# Patient Record
Sex: MALE | NOT HISPANIC OR LATINO | Employment: OTHER | URBAN - METROPOLITAN AREA
[De-identification: names, ages, dates, MRNs, and addresses within clinical notes are randomized per-mention and may not be internally consistent; named-entity substitution may affect disease eponyms.]

---

## 2019-01-01 ENCOUNTER — TRANSFERRED RECORDS (OUTPATIENT)
Dept: HEALTH INFORMATION MANAGEMENT | Facility: CLINIC | Age: 54
End: 2019-01-01

## 2019-07-17 ENCOUNTER — OFFICE VISIT (OUTPATIENT)
Dept: PEDIATRICS | Facility: CLINIC | Age: 54
End: 2019-07-17
Payer: COMMERCIAL

## 2019-07-17 VITALS
WEIGHT: 207.6 LBS | BODY MASS INDEX: 29.06 KG/M2 | OXYGEN SATURATION: 97 % | TEMPERATURE: 98.6 F | DIASTOLIC BLOOD PRESSURE: 76 MMHG | SYSTOLIC BLOOD PRESSURE: 122 MMHG | HEIGHT: 71 IN | HEART RATE: 80 BPM

## 2019-07-17 DIAGNOSIS — Z91.030 ALLERGY TO BEE STING: Primary | ICD-10-CM

## 2019-07-17 PROCEDURE — 99213 OFFICE O/P EST LOW 20 MIN: CPT | Mod: GE | Performed by: STUDENT IN AN ORGANIZED HEALTH CARE EDUCATION/TRAINING PROGRAM

## 2019-07-17 RX ORDER — EPINEPHRINE 0.3 MG/.3ML
0.3 INJECTION SUBCUTANEOUS
Qty: 1 EACH | Status: SHIPPED | OUTPATIENT
Start: 2019-07-17 | End: 2023-03-09

## 2019-07-17 ASSESSMENT — MIFFLIN-ST. JEOR: SCORE: 1798.8

## 2019-07-17 NOTE — PROGRESS NOTES
"Subjective     Francisco Castle is a 53 year old male who presents to clinic today for the following health issues:    HPI   Epi pen refill last reaction back in  (patient stung in posterior neck and experienced angioedema, vision changes and hives). Has not needed to use an epi-pen since then, just . No other allergies as far as patient knows. Going camping in [place] soon so would not like to have the same experience.     Retired from the [employer] this year, last physical exam in 2018; would like full physical in a few months. Colonoscopy earlier this spring 2019, was reportedly negative at [facility].     There is no problem list on file for this patient.  Confirmed with patient  History reviewed. No pertinent surgical history.   Confirmed with patient   Social History     Tobacco Use     Smoking status: Smoker, Current Status Unknown     Smokeless tobacco: Never Used     Tobacco comment: states smokes 1 pack per year   Substance Use Topics     Alcohol use: Yes     Comment: social     History reviewed. No pertinent family history.   Did not elicit      Current Outpatient Medications   Medication Sig Dispense Refill     EPINEPHrine (EPIPEN/ADRENACLICK/OR ANY BX GENERIC EQUIV) 0.3 MG/0.3ML injection 2-pack Inject 0.3 mLs (0.3 mg) into the muscle once as needed for anaphylaxis 1 each prn     EPINEPHrine (EPIPEN 2-DAVI) 0.3 MG/0.3ML injection Inject SQ PRN. 2 each 3     NO ACTIVE MEDICATIONS        Allergies   Allergen Reactions     Bees      Reviewed and updated as needed this visit by Provider  Tobacco  Soc Hx      Review of Systems   ROS COMP: Constitutional, HEENT, cardiovascular, pulmonary, gi and gu systems are negative, except as otherwise noted.      Objective    /76 (BP Location: Right arm, Patient Position: Chair, Cuff Size: Adult Large)   Pulse 80   Temp 98.6  F (37  C) (Oral)   Ht 1.803 m (5' 11\")   Wt 94.2 kg (207 lb 9.6 oz)   SpO2 97%   BMI 28.95 kg/m    Body mass index is " 28.95 kg/m .   Physical Exam   GENERAL: healthy, alert and no distress  EYES: Eyes grossly normal to inspection, PERRL and conjunctivae and sclerae normal  HENT: nose and mouth without ulcers or lesions  NECK: no adenopathy, no asymmetry, masses, or scars   RESP: lungs clear to auscultation - no rales, rhonchi or wheezes  CV: regular rate and rhythm, normal S1 S2, no S3 or S4, no murmur, click or rub, no peripheral edema and peripheral pulses strong  ABDOMEN: soft, nontender, no hepatosplenomegaly, no masses and bowel sounds normal  MS: no gross musculoskeletal defects noted, no edema  SKIN: no suspicious lesions or rashes  NEURO: Normal strength and tone, mentation intact and speech normal  PSYCH: mentation appears normal, affect normal/bright    Diagnostic Test Results:  Labs reviewed in Epic    Assessment & Plan   Francisco Castle is a 52 yo gentleman, with no significant medical history coming in today for epi-pen refill    1. Allergy to bee sting  Has not had any allergic reactions since ; has not needed epi-pen since then. However patient is going on trip and medication has  so is coming in today for this. Did discuss annual physical exam that will be due in 2019; had previous exams through the [employer]. (retired recently).   - EPINEPHrine (EPIPEN/ADRENACLICK/OR ANY BX GENERIC EQUIV) 0.3 MG/0.3ML injection 2-pack; Inject 0.3 mLs (0.3 mg) into the muscle once as needed for anaphylaxis  Dispense: 1 each; Refill: prn    Follow up in 2019 for yearly physical     This patient [facility] Spicer [facility]  [facility]    I have discussed the patient with Dr. Spicer and agree with the jointly developed plan as documented above.    Rosa Orosco MD  Internal Medicine-Pediatrics

## 2021-08-24 ENCOUNTER — NURSE TRIAGE (OUTPATIENT)
Dept: PEDIATRICS | Facility: CLINIC | Age: 56
End: 2021-08-24

## 2021-08-24 NOTE — TELEPHONE ENCOUNTER
"o2 95 with a pulse of 92 at rest. Patient feels SOB at rest and has noticed a racing heart at times.     Reason for Disposition    MODERATE difficulty breathing (e.g., speaks in phrases, SOB even at rest, pulse 100-120)    Answer Assessment - Initial Assessment Questions  1. COVID-19 DIAGNOSIS: \"Who made your Coronavirus (COVID-19) diagnosis?\" \"Was it confirmed by a positive lab test?\" If not diagnosed by a HCP, ask \"Are there lots of cases (community spread) where you live?\" (See [facility] health department website, if unsure)      Dx positive on 21 at [facility]   2. COVID-19 EXPOSURE: \"Was there any known exposure to COVID before the symptoms began?\" CDC Definition of close contact: within 6 feet (2 meters) for a total of 15 minutes or more over a 24-hour period.       Patient went to a  and was exposed to COVID there.  3. ONSET: \"When did the COVID-19 symptoms start?\"      Symptoms started on 21.   4. WORST SYMPTOM: \"What is your worst symptom?\" (e.g., cough, fever, shortness of breath, muscle aches)      Fatigue and lack of appitit.   5. COUGH: \"Do you have a cough?\" If so, ask: \"How bad is the cough?\"        Yes but intermittent. Not waking up at night due to a cough.   6. FEVER: \"Do you have a fever?\" If so, ask: \"What is your temperature, how was it measured, and when did it start?\"      Afebrile  7. RESPIRATORY STATUS: \"Describe your breathing?\" (e.g., shortness of breath, wheezing, unable to speak)       Short of breath, wheezing in the AM, O2 at rest right now is 95%  8. BETTER-SAME-WORSE: \"Are you getting better, staying the same or getting worse compared to yesterday?\"  If getting worse, ask, \"In what way?\"      The same.   9. HIGH RISK DISEASE: \"Do you have any chronic medical problems?\" (e.g., asthma, heart or lung disease, weak immune system, obesity, etc.)      None   10. PREGNANCY: \"Is there any chance you are pregnant?\" \"When was your last menstrual period?\"        n/a  11. " "OTHER SYMPTOMS: \"Do you have any other symptoms?\"  (e.g., chills, fatigue, headache, loss of smell or taste, muscle pain, sore throat; new loss of smell or taste especially support the diagnosis of COVID-19)        Lack of appetite and fatigue, and increased HR (currently 92 at rest.)    Protocols used: CORONAVIRUS (COVID-19) DIAGNOSED OR [id number] 3.25    Roseann Michaels RN on 8/24/2021 at 1:26 PM    "

## 2023-03-09 ENCOUNTER — OFFICE VISIT (OUTPATIENT)
Dept: PEDIATRICS | Facility: CLINIC | Age: 58
End: 2023-03-09
Payer: COMMERCIAL

## 2023-03-09 VITALS
TEMPERATURE: 98.1 F | WEIGHT: 213.2 LBS | HEIGHT: 71 IN | OXYGEN SATURATION: 98 % | BODY MASS INDEX: 29.85 KG/M2 | SYSTOLIC BLOOD PRESSURE: 170 MMHG | DIASTOLIC BLOOD PRESSURE: 88 MMHG | HEART RATE: 101 BPM | RESPIRATION RATE: 20 BRPM

## 2023-03-09 DIAGNOSIS — Z91.030 ALLERGY TO BEE STING: ICD-10-CM

## 2023-03-09 DIAGNOSIS — Z00.00 ROUTINE GENERAL MEDICAL EXAMINATION AT A HEALTH CARE FACILITY: Primary | ICD-10-CM

## 2023-03-09 DIAGNOSIS — Z13.1 SCREENING FOR DIABETES MELLITUS: ICD-10-CM

## 2023-03-09 DIAGNOSIS — R22.1 NECK MASS: ICD-10-CM

## 2023-03-09 DIAGNOSIS — R03.0 ELEVATED BLOOD PRESSURE READING WITHOUT DIAGNOSIS OF HYPERTENSION: ICD-10-CM

## 2023-03-09 DIAGNOSIS — Z13.220 SCREENING FOR HYPERLIPIDEMIA: ICD-10-CM

## 2023-03-09 LAB
ALBUMIN UR-MCNC: NEGATIVE MG/DL
ANION GAP SERPL CALCULATED.3IONS-SCNC: 10 MMOL/L (ref 7–15)
APPEARANCE UR: CLEAR
BASOPHILS # BLD AUTO: 0 10E3/UL (ref 0–0.2)
BASOPHILS NFR BLD AUTO: 0 %
BILIRUB UR QL STRIP: NEGATIVE
BUN SERPL-MCNC: 15.6 MG/DL (ref 8–23)
CALCIUM SERPL-MCNC: 9.7 MG/DL (ref 8.6–10)
CHLORIDE SERPL-SCNC: 106 MMOL/L (ref 98–107)
CHOLEST SERPL-MCNC: 193 MG/DL
COLOR UR AUTO: YELLOW
CREAT SERPL-MCNC: 0.98 MG/DL (ref 0.67–1.17)
DEPRECATED HCO3 PLAS-SCNC: 27 MMOL/L (ref 22–29)
EOSINOPHIL # BLD AUTO: 0.1 10E3/UL (ref 0–0.7)
EOSINOPHIL NFR BLD AUTO: 1 %
ERYTHROCYTE [DISTWIDTH] IN BLOOD BY AUTOMATED COUNT: 11.9 % (ref 10–15)
GFR SERPL CREATININE-BSD FRML MDRD: 89 ML/MIN/1.73M2
GLUCOSE SERPL-MCNC: 122 MG/DL (ref 70–99)
GLUCOSE UR STRIP-MCNC: NEGATIVE MG/DL
HBA1C MFR BLD: 5.5 % (ref 0–5.6)
HCT VFR BLD AUTO: 43.9 % (ref 40–53)
HDLC SERPL-MCNC: 36 MG/DL
HGB BLD-MCNC: 14.8 G/DL (ref 13.3–17.7)
HGB UR QL STRIP: NEGATIVE
KETONES UR STRIP-MCNC: NEGATIVE MG/DL
LDLC SERPL CALC-MCNC: 123 MG/DL
LEUKOCYTE ESTERASE UR QL STRIP: NEGATIVE
LYMPHOCYTES # BLD AUTO: 2.3 10E3/UL (ref 0.8–5.3)
LYMPHOCYTES NFR BLD AUTO: 32 %
MCH RBC QN AUTO: 31 PG (ref 26.5–33)
MCHC RBC AUTO-ENTMCNC: 33.7 G/DL (ref 31.5–36.5)
MCV RBC AUTO: 92 FL (ref 78–100)
MONOCYTES # BLD AUTO: 0.5 10E3/UL (ref 0–1.3)
MONOCYTES NFR BLD AUTO: 8 %
NEUTROPHILS # BLD AUTO: 4 10E3/UL (ref 1.6–8.3)
NEUTROPHILS NFR BLD AUTO: 58 %
NITRATE UR QL: NEGATIVE
NONHDLC SERPL-MCNC: 157 MG/DL
PH UR STRIP: 5.5 [PH] (ref 5–7)
PLATELET # BLD AUTO: 226 10E3/UL (ref 150–450)
POTASSIUM SERPL-SCNC: 4.6 MMOL/L (ref 3.4–5.3)
RBC # BLD AUTO: 4.78 10E6/UL (ref 4.4–5.9)
RBC #/AREA URNS AUTO: NORMAL /HPF
SODIUM SERPL-SCNC: 143 MMOL/L (ref 136–145)
SP GR UR STRIP: 1.02 (ref 1–1.03)
TRIGL SERPL-MCNC: 168 MG/DL
UROBILINOGEN UR STRIP-ACNC: 0.2 E.U./DL
WBC # BLD AUTO: 6.9 10E3/UL (ref 4–11)
WBC #/AREA URNS AUTO: NORMAL /HPF

## 2023-03-09 PROCEDURE — 83036 HEMOGLOBIN GLYCOSYLATED A1C: CPT

## 2023-03-09 PROCEDURE — 90750 HZV VACC RECOMBINANT IM: CPT

## 2023-03-09 PROCEDURE — 85025 COMPLETE CBC W/AUTO DIFF WBC: CPT

## 2023-03-09 PROCEDURE — 90471 IMMUNIZATION ADMIN: CPT

## 2023-03-09 PROCEDURE — 99396 PREV VISIT EST AGE 40-64: CPT | Mod: 25

## 2023-03-09 PROCEDURE — 90472 IMMUNIZATION ADMIN EACH ADD: CPT

## 2023-03-09 PROCEDURE — 80061 LIPID PANEL: CPT

## 2023-03-09 PROCEDURE — 36415 COLL VENOUS BLD VENIPUNCTURE: CPT

## 2023-03-09 PROCEDURE — 80048 BASIC METABOLIC PNL TOTAL CA: CPT

## 2023-03-09 PROCEDURE — 81001 URINALYSIS AUTO W/SCOPE: CPT

## 2023-03-09 PROCEDURE — 90715 TDAP VACCINE 7 YRS/> IM: CPT

## 2023-03-09 RX ORDER — EPINEPHRINE 0.3 MG/.3ML
0.3 INJECTION SUBCUTANEOUS
Qty: 1 EACH | Status: SHIPPED | OUTPATIENT
Start: 2023-03-09

## 2023-03-09 SDOH — ECONOMIC STABILITY: FOOD INSECURITY: WITHIN THE PAST 12 MONTHS, YOU WORRIED THAT YOUR FOOD WOULD RUN OUT BEFORE YOU GOT MONEY TO BUY MORE.: PATIENT DECLINED

## 2023-03-09 SDOH — ECONOMIC STABILITY: INCOME INSECURITY: HOW HARD IS IT FOR YOU TO PAY FOR THE VERY BASICS LIKE FOOD, HOUSING, MEDICAL CARE, AND HEATING?: PATIENT DECLINED

## 2023-03-09 SDOH — ECONOMIC STABILITY: INCOME INSECURITY: IN THE LAST 12 MONTHS, WAS THERE A TIME WHEN YOU WERE NOT ABLE TO PAY THE MORTGAGE OR RENT ON TIME?: PATIENT REFUSED

## 2023-03-09 SDOH — ECONOMIC STABILITY: FOOD INSECURITY: WITHIN THE PAST 12 MONTHS, THE FOOD YOU BOUGHT JUST DIDN'T LAST AND YOU DIDN'T HAVE MONEY TO GET MORE.: PATIENT DECLINED

## 2023-03-09 SDOH — HEALTH STABILITY: PHYSICAL HEALTH
ON AVERAGE, HOW MANY DAYS PER WEEK DO YOU ENGAGE IN MODERATE TO STRENUOUS EXERCISE (LIKE A BRISK WALK)?: PATIENT DECLINED

## 2023-03-09 SDOH — ECONOMIC STABILITY: TRANSPORTATION INSECURITY
IN THE PAST 12 MONTHS, HAS THE LACK OF TRANSPORTATION KEPT YOU FROM MEDICAL APPOINTMENTS OR FROM GETTING MEDICATIONS?: PATIENT DECLINED

## 2023-03-09 SDOH — HEALTH STABILITY: PHYSICAL HEALTH: ON AVERAGE, HOW MANY MINUTES DO YOU ENGAGE IN EXERCISE AT THIS LEVEL?: PATIENT DECLINED

## 2023-03-09 SDOH — ECONOMIC STABILITY: TRANSPORTATION INSECURITY
IN THE PAST 12 MONTHS, HAS LACK OF TRANSPORTATION KEPT YOU FROM MEETINGS, WORK, OR FROM GETTING THINGS NEEDED FOR DAILY LIVING?: PATIENT DECLINED

## 2023-03-09 ASSESSMENT — ENCOUNTER SYMPTOMS
HEADACHES: 0
ARTHRALGIAS: 0
CONSTIPATION: 0
FREQUENCY: 0
ABDOMINAL PAIN: 0
SHORTNESS OF BREATH: 0
HEMATOCHEZIA: 0
CHILLS: 0
HEMATURIA: 0
HEARTBURN: 0
JOINT SWELLING: 0
PARESTHESIAS: 0
DIARRHEA: 0
NERVOUS/ANXIOUS: 0
FEVER: 0
DYSURIA: 0
DIZZINESS: 0
NAUSEA: 0
WEAKNESS: 0
EYE PAIN: 0
COUGH: 0
MYALGIAS: 0
SORE THROAT: 0
PALPITATIONS: 0

## 2023-03-09 ASSESSMENT — SOCIAL DETERMINANTS OF HEALTH (SDOH)
IN A TYPICAL WEEK, HOW MANY TIMES DO YOU TALK ON THE PHONE WITH FAMILY, FRIENDS, OR NEIGHBORS?: PATIENT DECLINED
ARE YOU MARRIED, WIDOWED, DIVORCED, SEPARATED, NEVER MARRIED, OR LIVING WITH A PARTNER?: PATIENT DECLINED
DO YOU BELONG TO ANY CLUBS OR ORGANIZATIONS SUCH AS CHURCH GROUPS UNIONS, FRATERNAL OR ATHLETIC GROUPS, OR SCHOOL GROUPS?: PATIENT DECLINED
HOW OFTEN DO YOU GET TOGETHER WITH FRIENDS OR RELATIVES?: PATIENT DECLINED
HOW OFTEN DO YOU ATTEND CHURCH OR RELIGIOUS SERVICES?: PATIENT DECLINED

## 2023-03-09 ASSESSMENT — LIFESTYLE VARIABLES
HOW MANY STANDARD DRINKS CONTAINING ALCOHOL DO YOU HAVE ON A TYPICAL DAY: PATIENT DECLINED
SKIP TO QUESTIONS 9-10: 0
HOW OFTEN DO YOU HAVE SIX OR MORE DRINKS ON ONE OCCASION: PATIENT DECLINED
AUDIT-C TOTAL SCORE: -1
HOW OFTEN DO YOU HAVE A DRINK CONTAINING ALCOHOL: PATIENT DECLINED

## 2023-03-09 ASSESSMENT — PAIN SCALES - GENERAL: PAINLEVEL: NO PAIN (0)

## 2023-03-09 NOTE — PROGRESS NOTES
SUBJECTIVE:   CC: Francisco is an 57 year old who presents for preventative health visit.   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 Checks BP at drug store when getting vaccines and it has been high in the 160s systolic recently. Eats a lot of salt so thinks that is contributing to/the cause of his high BP. Would like to avoid medications and try cutting out salt first. Exercises five days a week, uses treadmill and weight lifting machines. Has a lot of stress with job.    Has a large lump under his neck that has been present for years, has never been evaluated. His father had the same thing so he never sought care.    Hearing issues, worse in busy rooms. Had exposure to sirens working as a .    Does not want COVID-19 vaccines, has had COVID infection multiple times and did okay with that so would like to avoid the vaccine.      Healthy Habits:     Getting at least 3 servings of Calcium per day:  Yes    Bi-annual eye exam:  NO    Dental care twice a year:  NO    Sleep apnea or symptoms of sleep apnea:  None    Diet:  Other    Frequency of exercise:  4-5 days/week    Duration of exercise:  30-45 minutes    Taking medications regularly:  Not Applicable    Medication side effects:  Not applicable    PHQ-2 Total Score: 0    Additional concerns today:  No    Today's PHQ-2 Score:   PHQ-2 ( 1999 Pfizer) 3/9/2023   Q1: Little interest or pleasure in doing things 0   Q2: Feeling down, depressed or hopeless 0   PHQ-2 Score 0   PHQ-2 Total Score (12-17 Years)- Positive if 3 or more points; Administer PHQ-A if positive -   Q1: Little interest or pleasure in doing things Not at all   Q2: Feeling down, depressed or hopeless Not at all   PHQ-2 Score 0       Have you ever done Advance Care Planning? (For example, a Health Directive,  POLST, or a discussion with a medical provider or your loved ones about your wishes): No, advance care planning information given to patient to review.  Patient declined advance care planning discussion at this time.    Social History     Tobacco Use     Smoking status: Former     Packs/day: 1.00     Years: 5.00     Pack years: 5.00     Types: Cigarettes     Smokeless tobacco: Never     Tobacco comments:     states smokes 1 pack per year   Substance Use Topics     Alcohol use: Yes     Comment: social, 6 in a week       Alcohol Use 3/9/2023   Prescreen: >3 drinks/day or >7 drinks/week? No       Last PSA: No results found for: PSA    Reviewed orders with patient. Reviewed health maintenance and updated orders accordingly - Yes    Reviewed and updated as needed this visit by clinical staff   Tobacco  Allergies  Meds   Med Hx  Surg Hx  Fam Hx  Soc Hx        Reviewed and updated as needed this visit by Provider   Tobacco     Med Hx  Surg Hx  Fam Hx  Soc Hx         Review of Systems   Constitutional: Negative for chills and fever.   HENT: Positive for hearing loss. Negative for congestion, ear pain and sore throat.    Eyes: Negative for pain and visual disturbance.   Respiratory: Negative for cough and shortness of breath.    Cardiovascular: Negative for chest pain, palpitations and peripheral edema.   Gastrointestinal: Negative for abdominal pain, constipation, diarrhea, heartburn, hematochezia and nausea.   Genitourinary: Negative for dysuria, frequency, genital sores, hematuria, impotence, penile discharge and urgency.   Musculoskeletal: Negative for arthralgias, joint swelling and myalgias.   Skin: Negative for rash.   Neurological: Negative for dizziness, weakness, headaches and paresthesias.   Psychiatric/Behavioral: Negative for mood changes. The patient is not nervous/anxious.        OBJECTIVE:   BP (!) 170/88 (BP Location: Right arm, Patient Position: Sitting, Cuff Size: Adult Large)   Pulse 101    "Temp 98.1  F (36.7  C) (Tympanic)   Resp 20   Ht 1.803 m (5' 11\")   Wt 96.7 kg (213 lb 3.2 oz)   SpO2 98%   BMI 29.74 kg/m      Physical Exam  Gen: awake and alert, no apparent distress, appears stated age, sitting comfortably upright in chair.  HEENT: head atraumatic and normocephalic, hearing grossly normal, PERRLA, EOM grossly intact, no conjunctival injection or icterus, no nasal drainage, no oral ulcers, normal dentition, moist mucous membranes  Neck: 4x3cm firm rubbery mass, mobile and non-tender, in midline neck submental space without overlying skin changes. Neck otherwise supple, no lymphadenopathy, normal thyroid exam, no stiffness, normal ROM  Back: spine is straight, spine and paraspinal muscles non-tender to palpation throughout, full ROM  CV: RRR, normal S1 and S2, no murmurs, rubs, or gallops, normal radial pulses, normal capillary refill.  Pulm: CTAB, no wheezes, rhonchi, or rales. No increased WOB on room air.  Abd: soft, non-tender, non-distended, normal bowel sounds throughout, no HSM.  Ext: no LE edema, no joint swelling, full ROM of all joints in upper and lower extremities  Skin: warm and dry, no rashes, pallor, cyanosis, jaundice, or bruising to visible skin.  Neuro: A&Ox3, answers questions appropriately, normal speech, CN II-XII grossly intact, normal muscle tone, moves all extremities equally.  Psych: normal affect, makes good eye contact      Diagnostic Test Results:  Labs reviewed in Epic  Results for orders placed or performed in visit on 03/09/23 (from the past 24 hour(s))   CBC with platelets and differential    Narrative    The following orders were created for panel order CBC with platelets and differential.  Procedure                               Abnormality         Status                     ---------                               -----------         ------                     CBC with platelets and d...[[id number]]                                                   Please view " "results for these tests on the individual orders.       ASSESSMENT/PLAN:   Francisco was seen today for physical.    Diagnoses and all orders for this visit:    Routine general medical examination at a health care facility  -     TDAP VACCINE (Adacel, Boostrix)  -     ZOSTER VACCINE RECOMBINANT ADJUVANTED (SHINGRIX)    Screening for diabetes mellitus        -     Hemoglobin A1c    Screening for hyperlipidemia  -     Lipid panel reflex to direct LDL Non-fasting    Allergy to bee sting  -     EPINEPHrine (ANY BX GENERIC EQUIV) 0.3 MG/0.3ML injection 2-pack; Inject 0.3 mLs (0.3 mg) into the muscle once as needed for anaphylaxis    Elevated blood pressure reading without diagnosis of hypertension  -     Basic metabolic panel  (Ca, Cl, CO2, Creat, Gluc, K, Na, BUN)  -     UA with Microscopic reflex to Culture - lab collect    Neck mass  Large and present for years, similar mass in his father per report. Does not seem consistent with thyroid mass given location much higher up in neck, less likely branchial cleft cyst as it is midline, unlikely abscess given chronic nature and lack of tenderness or overlying skin changes. Possibly  thyroglossal duct cyst or cystic change to salivary glands in submental space. Will start evaluation with an ultrasound and screen for leukocytosis or other signs of lymphoma.  -     US Head Neck Soft Tissue; Future  -     CBC with platelets and differential        COUNSELING:   Reviewed preventive health counseling, as reflected in patient instructions       Regular exercise       Healthy diet/nutrition       Hearing screening       Immunizations    Vaccinated for: TDAP and Zoster and Declined: Covid-19    BMI:   Estimated body mass index is 29.74 kg/m  as calculated from the following:    Height as of this encounter: 1.803 m (5' 11\").    Weight as of this encounter: 96.7 kg (213 lb 3.2 oz).   Weight management plan: Discussed healthy diet and exercise guidelines      He reports that he has quit " smoking. His smoking use included cigarettes. He has a 5.00 pack-year smoking history. He has never used smokeless tobacco.      Ly Haywood MD  [facility]    I have seen this patient and I was present during all critical and key portions of the procedure/exam and immediately available to furnish services during the entire service. I have reviewed the documentation from this resident and discussed the findings with them, and I agree with the documentation their documentation.      Reji Santana MD  Internal Medicine and Pediatrics

## 2023-03-22 ENCOUNTER — HOSPITAL ENCOUNTER (OUTPATIENT)
Dept: ULTRASOUND IMAGING | Facility: CLINIC | Age: 58
Discharge: HOME | End: 2023-03-22
Attending: INTERNAL MEDICINE | Admitting: INTERNAL MEDICINE
Payer: COMMERCIAL

## 2023-03-22 DIAGNOSIS — R22.1 NECK MASS: ICD-10-CM

## 2023-03-22 PROCEDURE — 76536 US EXAM OF HEAD AND NECK: CPT

## 2023-03-23 DIAGNOSIS — R22.1 NECK MASS: Primary | ICD-10-CM

## 2023-03-28 ENCOUNTER — TELEPHONE (OUTPATIENT)
Dept: OTOLARYNGOLOGY | Facility: CLINIC | Age: 58
End: 2023-03-28
Payer: COMMERCIAL

## 2023-03-28 NOTE — TELEPHONE ENCOUNTER
[facility] Call Center    Phone Message    May a detailed message be left on voicemail: no     Reason for Call: Appointment Intake    Referring Provider Name: Reji Santana MD  Diagnosis and/or Symptoms: neck mass      Sending to clinic for review      Action Taken: Other: ent    Travel Screening: Not Applicable

## 2023-03-29 NOTE — TELEPHONE ENCOUNTER
Spoke with patient, he would like a call back around 2pm on 3/29/23 to schedule.    This patient needs a New appt with Samina De La Cruz.      Appt Note- Ref by Reji Santana MD for Neck Mass (Pt has had lump for years and no change in size)

## 2023-12-08 NOTE — TELEPHONE ENCOUNTER
FUTURE VISIT INFORMATION      FUTURE VISIT INFORMATION:  Date: 1/5/24  Time: 11:30AM  Location: [facility] [place]  REFERRAL INFORMATION:  Referring provider:  Reji Santana MD   Referring providers clinic:  [facility]  Reason for visit/diagnosis  Neck mass- Referred by Reji Santana MD in  IM/PEDS.     RECORDS REQUESTED FROM:       Clinic name Comments Records Status Imaging Status   [facility]  3/9/23- OV Ly Lara MD  EPIC     IMAGING  3/22/23- US HEAD NECK  [facility]  PACS

## 2024-01-05 ENCOUNTER — PRE VISIT (OUTPATIENT)
Dept: OTOLARYNGOLOGY | Facility: CLINIC | Age: 59
End: 2024-01-05

## 2024-01-05 ENCOUNTER — OFFICE VISIT (OUTPATIENT)
Dept: OTOLARYNGOLOGY | Facility: CLINIC | Age: 59
End: 2024-01-05
Payer: COMMERCIAL

## 2024-01-05 VITALS — WEIGHT: 210 LBS | HEIGHT: 70 IN | BODY MASS INDEX: 30.06 KG/M2

## 2024-01-05 DIAGNOSIS — R22.1 NECK MASS: ICD-10-CM

## 2024-01-05 DIAGNOSIS — R22.1 SUBMENTAL MASS: Primary | ICD-10-CM

## 2024-01-05 PROCEDURE — 99244 OFF/OP CNSLTJ NEW/EST MOD 40: CPT | Performed by: STUDENT IN AN ORGANIZED HEALTH CARE EDUCATION/TRAINING PROGRAM

## 2024-01-05 NOTE — PROGRESS NOTES
Facial Plastic and Reconstructive Surgery Consultation    Referring Provider:   Referred Self, MD  No address on file    HPI:   I had the pleasure of seeing Francisco Castle today in clinic for consultation for neck mass. Francisco Castle is a 58 year old male.  He is here today with his wife.  He reports that the mass has been present for a long time and slowly growing.  He thinks his father had something similar.  He did not have any other lumps or bumps like this elsewhere.  It is nontender.  The overlying skin has always been normal without any drainage.  He has no numbness or tingling.  He has not noticed any asymmetries in his smile or difficulty with moving his face.  He does note that he has a really bad gag reflex and wondering if this is related to the mass.  He sometimes feels like he has trouble swallowing.  He reports he is otherwise healthy.  He has never had general anesthesia before.  He only takes vitamins and no other medications.  He has no allergies to medications.        Review Of Systems  ROS: 10 point ROS neg other than the symptoms noted above in the HPI.    There is no problem list on file for this patient.    No past surgical history on file.  Current Outpatient Medications   Medication Sig Dispense Refill    EPINEPHrine (ANY BX GENERIC EQUIV) 0.3 MG/0.3ML injection 2-pack Inject 0.3 mLs (0.3 mg) into the muscle once as needed for anaphylaxis 1 each prn    EPINEPHrine (EPIPEN 2-DAVI) 0.3 MG/0.3ML injection Inject SQ PRN. 2 each 3    NO ACTIVE MEDICATIONS        Bees  Social History     Socioeconomic History    Marital status:      Spouse name: Not on file    Number of children: Not on file    Years of education: Not on file    Highest education level: Not on file   Occupational History    Occupation: sells [employer]   Tobacco Use    Smoking status: Former     Packs/day: 1.00     Years: 5.00     Additional pack years: 0.00     Total pack years: 5.00     Types: Cigarettes    Smokeless  tobacco: Never    Tobacco comments:     states smokes 1 pack per year   Substance and Sexual Activity    Alcohol use: Yes     Comment: social, 6 in a week    Drug use: No    Sexual activity: Yes     Partners: Female     Comment: Lives at home with wife   Other Topics Concern    Parent/sibling w/ CABG, MI or angioplasty before 65F 55M? No   Social History Narrative    Not on file     Social Determinants of Health     Financial Resource Strain: Unknown (3/9/2023)    Overall Financial Resource Strain (CARDIA)     Difficulty of Paying Living Expenses: Patient declined   Food Insecurity: Unknown (3/9/2023)    Hunger Vital Sign     Worried About Running Out of Food in the Last Year: Patient declined     Ran Out of Food in the Last Year: Patient declined   Transportation Needs: Unknown (3/9/2023)    PRAPARE - Transportation     Lack of Transportation (Medical): Patient declined     Lack of Transportation (Non-Medical): Patient declined   Physical Activity: Patient Declined (3/9/2023)    Exercise Vital Sign     Days of Exercise per Week: Patient declined     Minutes of Exercise per Session: Patient declined   Stress: Patient Declined (3/9/2023)    [employer] - Occupational Stress Questionnaire     Feeling of Stress : Patient declined   Social Connections: Unknown (3/9/2023)    Social Connection and Isolation Panel [NHANES]     Frequency of Communication with Friends and Family: Patient declined     Frequency of Social Gatherings with Friends and Family: Patient declined     Attends Gnosticism Services: Patient declined     Active Member of Clubs or Organizations: Patient declined     Attends Club or Organization Meetings: Not on file     Marital Status: Patient declined   Interpersonal Safety: Not on file   Housing Stability: Unknown (3/9/2023)    Housing Stability Vital Sign     Unable to Pay for Housing in the Last Year: Patient refused     Number of Places Lived in the Last Year: 1      Unstable Housing in the Last Year: Patient refused     No family history on file.    PE:  Alert and Oriented, Answering Questions Appropriately  Atraumatic, Normocephalic, Face Symmetric  Skin: Haro 2  Facial Nerve Intact and facial movement symmetric  EOMI  Nasal Exam: No external Deformity  Chin: Normal   Lips/Teeth/Toungue/Gums: Lips intact.  Floor mouth is soft.  Tongue is normal and mobile.  Neck: Trachea midline.  There is a 4 cm well-circumscribed and soft submental mass in the midline.  This does not move with swallowing.  This is nontender to palpation.  The overlying skin is normal.  He has sensation over this area.  Chest: No wheezing, cyanosis, or stridor  Card: not diaphoretic  Neuro/Psych: CN's 2-12 intact, Moves all extremities, ambulation in intact, positive affect, no notable muscle weakness        Imaging:US imaging is personally reviewed by me. Formal read as follows:   IMPRESSION: There is approximately 4.3 x 4.1 x 2.4 cm isoechoic lesion  in the upper neck/chin area with no significant internal flow, likely  represents soft tissue lipoma.      IMPRESSION/PLAN: Francisco Castle is a 58 year old male with a submental mass.  On examination, the mass is about 4 cm and soft.  There are no concerning findings.  We did discuss that I would like to get a CT scan to further evaluate especially given that he feels like his swallowing is impacted.  My sense is that he is just feeling the weight of the mass and that it is not involving the underlying structures.  A CT scan will help us determine this for sure.  We discussed that if this is consistent with a lipoma on CT scan imaging, I would recommend excision.  These tend to continue growing slowly with time.  I discussed that I would make a submental incision and would remove the mass that way.  I discussed that the incision length would need to be as long as the mass in order to get it out. Risks and benefits were discussed including but not  limited to bleeding, hematoma, infection, scarring, asymmetry, irregularities, numbness/tingling (temporary or permanent), damage to motor nerves (temporary or permanent), need for additional procedures.  Will start with a CT scan and then go from there.      Photodocumentation was obtained.     I spent a total of 45 minutes in the care of patient Francisco Castle during today's office visit. This time includes reviewing the patient's chart and prior history, obtaining a history, performing an examination and evaluation and counseling the patient. This time also includes ordering mediations or tests necessary in addition to communication to other member's of the patient's health care team. Time spent in documentation and care coordination is included.

## 2024-01-05 NOTE — PROGRESS NOTES
Updated photodocumentation obtained (see media tab).    CT orders placed, pt will reach out once completed to discuss results.    Ludy Anderson RN  1/5/2024 2:15 PM

## 2024-01-24 ENCOUNTER — TELEPHONE (OUTPATIENT)
Dept: PLASTIC SURGERY | Facility: CLINIC | Age: 59
End: 2024-01-24

## 2024-01-30 ENCOUNTER — HOSPITAL ENCOUNTER (OUTPATIENT)
Dept: CT IMAGING | Facility: CLINIC | Age: 59
Discharge: HOME | End: 2024-01-30
Attending: STUDENT IN AN ORGANIZED HEALTH CARE EDUCATION/TRAINING PROGRAM | Admitting: STUDENT IN AN ORGANIZED HEALTH CARE EDUCATION/TRAINING PROGRAM
Payer: COMMERCIAL

## 2024-01-30 DIAGNOSIS — R22.1 SUBMENTAL MASS: ICD-10-CM

## 2024-01-30 PROCEDURE — 250N000011 HC RX IP 250 OP 636: Performed by: STUDENT IN AN ORGANIZED HEALTH CARE EDUCATION/TRAINING PROGRAM

## 2024-01-30 PROCEDURE — 70491 CT SOFT TISSUE NECK W/DYE: CPT

## 2024-01-30 PROCEDURE — 250N000009 HC RX 250: Performed by: STUDENT IN AN ORGANIZED HEALTH CARE EDUCATION/TRAINING PROGRAM

## 2024-01-30 RX ORDER — IOPAMIDOL 755 MG/ML
500 INJECTION, SOLUTION INTRAVASCULAR ONCE
Status: COMPLETED | OUTPATIENT
Start: 2024-01-30 | End: 2024-01-30

## 2024-01-30 RX ADMIN — IOPAMIDOL 90 ML: 755 INJECTION, SOLUTION INTRAVENOUS at 13:27

## 2024-01-30 RX ADMIN — SODIUM CHLORIDE 65 ML: 9 INJECTION, SOLUTION INTRAVENOUS at 13:27

## 2024-01-31 ENCOUNTER — TELEPHONE (OUTPATIENT)
Dept: PLASTIC SURGERY | Facility: CLINIC | Age: 59
End: 2024-01-31

## 2024-01-31 DIAGNOSIS — E04.1 THYROID NODULE: Primary | ICD-10-CM

## 2024-01-31 DIAGNOSIS — R22.1 NECK MASS: Primary | ICD-10-CM

## 2024-01-31 NOTE — PROGRESS NOTES
Facial Plastic and Reconstructive Surgery      I called patient to discuss results of CT scan. Findings consistent with submental lipoma. He would like to go forward with surgery and we will get this scheduled.     The CT scan also showed some incidental thyroid nodules. I have ordered a thyroid US to further evaluate these. He will call to get this scheduled (he was given the number by LOLITA [place].     Sofi Vanessa MD

## 2024-02-28 ENCOUNTER — OFFICE VISIT (OUTPATIENT)
Dept: PEDIATRICS | Facility: CLINIC | Age: 59
End: 2024-02-28
Payer: COMMERCIAL

## 2024-02-28 VITALS
OXYGEN SATURATION: 99 % | TEMPERATURE: 97.2 F | BODY MASS INDEX: 29.54 KG/M2 | HEIGHT: 71 IN | WEIGHT: 211 LBS | DIASTOLIC BLOOD PRESSURE: 97 MMHG | SYSTOLIC BLOOD PRESSURE: 161 MMHG | HEART RATE: 80 BPM | RESPIRATION RATE: 16 BRPM

## 2024-02-28 DIAGNOSIS — R06.83 SNORING: ICD-10-CM

## 2024-02-28 DIAGNOSIS — Z01.818 PREOPERATIVE EXAMINATION: Primary | ICD-10-CM

## 2024-02-28 DIAGNOSIS — R22.1 NECK MASS: ICD-10-CM

## 2024-02-28 DIAGNOSIS — R03.0 ELEVATED BLOOD PRESSURE READING WITHOUT DIAGNOSIS OF HYPERTENSION: ICD-10-CM

## 2024-02-28 DIAGNOSIS — E78.5 HYPERLIPIDEMIA LDL GOAL <100: ICD-10-CM

## 2024-02-28 PROCEDURE — 99214 OFFICE O/P EST MOD 30 MIN: CPT | Performed by: NURSE PRACTITIONER

## 2024-02-28 RX ORDER — MULTIVITAMIN,THERAPEUTIC
1 TABLET ORAL DAILY
COMMUNITY

## 2024-02-28 ASSESSMENT — PAIN SCALES - GENERAL: PAINLEVEL: NO PAIN (0)

## 2024-02-28 NOTE — PROGRESS NOTES
Preoperative Evaluation  [facility] [place]  [address]  [address]  [place] [address]  Phone: [phone]  Fax: [phone]  Primary Provider: No Ref-Primary, Physician  Pre-op Performing Provider: SONIA JOSE  Feb 28, 2024       King is a 58 year old, presenting for the following:  Pre-Op Exam        2/28/2024     1:15 PM   Additional Questions   Roomed by Sydnie THOMAS   Accompanied by Self   Failed to redirect to the Timeline version of the [email or web address] SmartLink.      2/28/2024     1:15 PM   Patient Reported Additional Medications   Patient reports taking the following new medications n/a     Surgical Information  Surgery/Procedure: mass on neck removed   Surgery Location: [facility]  Surgeon: Dr. Vanessa  Surgery Date: 3/13/24  Time of Surgery: TBD  Where patient plans to recover: At home with family  Fax number for surgical facility: [phone]    Assessment & Plan     The proposed surgical procedure is considered INTERMEDIATE risk.    Preoperative examination  Neck mass  Francisco Castle is a 58 year old male with a submental mass.  On examination, the mass is about 4 cm and soft.  There are no concerning findings. CT imaging shows likely lipoma. Due to the continued slow growing nature of lipomas, ENT recommended excision of the mass. He is medically optimized for surgery with the exception of undiagnosed hypertension, see below.     Elevated blood pressure reading without diagnosis of hypertension  BP high today, rechecked and remains high, 160s/90s. Reports he has a blood pressure cuff at home and when he checks his BP at home, -140s. It sounds like he has been told he has high blood pressure in the past and for this reason he starting taking a supplement that is suppose to help with high blood pressure. His wife buys it off the internet, he doesn't know what it is called. He has no interest in starting blood pressure medications  despite being counseled on the risks of long term hypertension. I have recommended he check his BP at home daily over the next week and send me readings over [employer]. Will decide at that time if he can proceed with surgery as planned.     Addendum 3/11/2024: Patient sent in home blood pressures, see below. He has hypertension and I have recommend he start antihypertensives. I do not feel his blood pressure is in a dangerous range and therefore it is reasonable for him to proceed with his procedure this week as planned. However, I have advised that he return postoperatively to discuss starting antihypertensives and that should he neglect to do so, he will be at an increased risk for cardiovascular disease/events.     Date Blood Pressure Reading    2/29/2024  154/95   3/1/2024 140/90   3/2/2024 128/87   3/3/2024 140/89   3/4/2024 140/86   3/5/2024 145/88   3/6/2024  150/90, 138/90      Hyperlipidemia LDL goal <100  History of high cholesterol based on lipid panel in 2023. ASCVD risk score of 15.4%. Similar to his high blood pressure supplement, he takes another one for high cholesterol. Details of this are unknown.     Snoring  Admits to reports of snoring from his wife. No formal diagnosis of SAVANNAH. Denies excessive daytime drowsiness.         - No identified additional risk factors other than previously addressed    Antiplatelet or Anticoagulation Medication Instructions   - Patient is on no antiplatelet or anticoagulation medications.    Additional Medication Instructions  Patient is to take all scheduled medications on the day of surgery EXCEPT for modifications listed below:   - Herbal medications and vitamins: HOLD 14 days prior to surgery.    Recommendation  APPROVAL GIVEN to proceed with proposed procedure, without further diagnostic evaluation.      22 minutes spent by me on the date of the encounter doing chart review, review of test results, interpretation of tests, patient visit, and documentation      Subjective       HPI related to upcoming procedure: Francisco Castle is a 58 year old male with a submental mass.  On examination, the mass is about 4 cm and soft.  There are no concerning findings. CT imaging shows likely lipoma. Due to the continued slow growing nature of lipomas, ENT recommended excision of the mass.     It sounds like he has been told he has high blood pressure in the past and for this reason he starting taking a supplement that is suppose to help with high blood pressure. His wife buys it off the internet, he doesn't know what it is called. He has no interest in starting blood pressure medications.     BP Readings from Last 3 Encounters:   02/28/24 (!) 168/70   03/09/23 (!) 170/88   07/17/19 122/76     When he takes his BP at home, reports it's 130-140s systolic.     History of high cholesterol based on lipid panel in 2023.     The 10-year ASCVD risk score (Danny LAWRENCE, et al., 2019) is: 15.4%    Values used to calculate the score:      Age: 58 years      Sex: Male      Is Non- : No      Diabetic: No      Tobacco smoker: No      Systolic Blood Pressure: 161 mmHg      Is BP treated: No      HDL Cholesterol: 36 mg/dL      Total Cholesterol: 193 mg/dL        2/21/2024    10:14 AM   Preop Questions   1. Have you ever had a heart attack or stroke? No   2. Have you ever had surgery on your heart or blood vessels, such as a stent placement, a coronary artery bypass, or surgery on an artery in your head, neck, heart, or legs? No   3. Do you have chest pain with activity? No   4. Do you have a history of  heart failure? No   5. Do you currently have a cold, bronchitis or symptoms of other infection? No   6. Do you have a cough, shortness of breath, or wheezing? No   7. Do you or anyone in your family have previous history of blood clots? No   8. Do you or does anyone in your family have a serious bleeding problem such as prolonged bleeding following surgeries or cuts? No   9.  Have you ever had problems with anemia or been told to take iron pills? No   10. Have you had any abnormal blood loss such as black, tarry or bloody stools? No   11. Have you ever had a blood transfusion? No   12. Are you willing to have a blood transfusion if it is medically needed before, during, or after your surgery? Yes   13. Have you or any of your relatives ever had problems with anesthesia? No   14. Do you have sleep apnea, excessive snoring or daytime drowsiness? No   15. Do you have any artifical heart valves or other implanted medical devices like a pacemaker, defibrillator, or continuous glucose monitor? No   16. Do you have artificial joints? No   17. Are you allergic to latex? No       Preoperative Review of    reviewed - no record of controlled substances prescribed.        Patient Active Problem List   Diagnosis    Elevated blood pressure reading without diagnosis of hypertension    Hyperlipidemia LDL goal <100     No past medical history on file.    No past surgical history on file.    No family history on file.    Social History     Socioeconomic History    Marital status:      Spouse name: Not on file    Number of children: Not on file    Years of education: Not on file    Highest education level: Not on file   Occupational History    Occupation: [employer]   Tobacco Use    Smoking status: Former     Packs/day: 1.00     Years: 5.00     Additional pack years: 0.00     Total pack years: 5.00     Types: Cigarettes     Start date: 1979     Quit date: 1988     Years since quittin.1    Smokeless tobacco: Never    Tobacco comments:     states smokes 1 pack per year   Vaping Use    Vaping Use: Never used   Substance and Sexual Activity    Alcohol use: Yes     Comment: social, 6 in a week    Drug use: No    Sexual activity: Yes     Partners: Female     Comment: Lives at home with wife   Other Topics Concern    Parent/sibling w/ CABG, MI or angioplasty before 65F 55M? No   Social  History Narrative    Not on file     Social Determinants of Health     Financial Resource Strain: Unknown (3/9/2023)    Overall Financial Resource Strain (CARDIA)     Difficulty of Paying Living Expenses: Patient declined   Food Insecurity: Unknown (3/9/2023)    Hunger Vital Sign     Worried About Running Out of Food in the Last Year: Patient declined     Ran Out of Food in the Last Year: Patient declined   Transportation Needs: Unknown (3/9/2023)    PRAPARE - Transportation     Lack of Transportation (Medical): Patient declined     Lack of Transportation (Non-Medical): Patient declined   Physical Activity: Patient Declined (3/9/2023)    Exercise Vital Sign     Days of Exercise per Week: Patient declined     Minutes of Exercise per Session: Patient declined   Stress: Patient Declined (3/9/2023)    [employer] - Occupational Stress Questionnaire     Feeling of Stress : Patient declined   Social Connections: Unknown (3/9/2023)    Social Connection and Isolation Panel [NHANES]     Frequency of Communication with Friends and Family: Patient declined     Frequency of Social Gatherings with Friends and Family: Patient declined     Attends Roman [employer] Services: Patient declined     Active Member of Clubs or Organizations: Patient declined     Attends Club or Organization Meetings: Not on file     Marital Status: Patient declined   Interpersonal Safety: Low Risk  (2/28/2024)    Interpersonal Safety     Do you feel physically and emotionally safe where you currently live?: Yes     Within the past 12 months, have you been hit, slapped, kicked or otherwise physically hurt by someone?: No     Within the past 12 months, have you been humiliated or emotionally abused in other ways by your partner or ex-partner?: No   Housing Stability: Unknown (3/9/2023)    Housing Stability Vital Sign     Unable to Pay for Housing in the Last Year: Patient refused     Number of Places Lived in the Last Year: 1      "Unstable Housing in the Last Year: Patient refused     Current Outpatient Medications   Medication    EPINEPHrine (ANY BX GENERIC EQUIV) 0.3 MG/0.3ML injection 2-pack    multivitamin, therapeutic (THERA-VIT) TABS tablet    Probiotic Product (PROBIOTIC ADVANCED PO)    UNABLE TO FIND    EPINEPHrine (EPIPEN 2-DAVI) 0.3 MG/0.3ML injection     No current facility-administered medications for this visit.        Allergies   Allergen Reactions    Bees        Review of Systems    Review of Systems  CONSTITUTIONAL: NEGATIVE for fever, chills, change in weight  INTEGUMENTARY/SKIN: NEGATIVE for worrisome rashes, moles or lesions  EYES: NEGATIVE for vision changes or irritation  ENT/MOUTH: NEGATIVE for ear, mouth and throat problems  RESP: NEGATIVE for significant cough or SOB  BREAST: NEGATIVE for masses, tenderness or discharge  CV: NEGATIVE for chest pain, palpitations or peripheral edema  GI: NEGATIVE for nausea, abdominal pain, heartburn, or change in bowel habits  : NEGATIVE for frequency, dysuria, or hematuria  MUSCULOSKELETAL: NEGATIVE for significant arthralgias or myalgia  NEURO: NEGATIVE for weakness, dizziness or paresthesias  ENDOCRINE: NEGATIVE for temperature intolerance, skin/hair changes  HEME: NEGATIVE for bleeding problems  PSYCHIATRIC: NEGATIVE for changes in mood or affect    Objective    BP (!) 161/97 (BP Location: Left arm, Patient Position: Sitting, Cuff Size: Adult Regular)   Pulse 80   Temp 97.2  F (36.2  C) (Tympanic)   Resp 16   Ht 1.803 m (5' 11\")   Wt 95.7 kg (211 lb)   SpO2 99%   BMI 29.43 kg/m     Estimated body mass index is 29.43 kg/m  as calculated from the following:    Height as of this encounter: 1.803 m (5' 11\").    Weight as of this encounter: 95.7 kg (211 lb).  Physical Exam  Constitutional: appears to be in no acute distress, comfortable, pleasant.   Eyes: anicteric, conjunctiva clear without drainage or erythema. SHER.   Ears, Nose and Throat: tympanic membranes gray with LR,  " nose without nasal discharge. OP: no erythema to posterior pharynx, negative post nasal drainage, tonsils +1 no erythema or exudate.  Neck: supple, thyroid palpable,not enlarged, no nodules   Cardiovascular: regular rate and rhythm, normal S1 and S2, no murmurs, rubs or gallops, peripheral pulses full and symmetric; negative peripheral edema   Respiratory: Air entry throughout. Breathing pattern unlabored without the use of accessory muscles. Clear to auscultation A and P, no wheezes or crackles, normal breath sounds.    Gastrointestinal: rounded, soft. Positive bowel sounds x4, nontender, no masses.   Musculoskeletal: full range of motion, no edema.   Skin: pink, turgor smooth and elastic. Negative for lesions or dryness.  Neurological: normal gait, no tremor.   Psychological: appropriate mood and affect.   Lymphatic: no cervical, axillary, supraclavicular, or infraclavicular lymphadenopathy.    Recent Labs   Lab Test 03/09/23  0951   HGB 14.8         POTASSIUM 4.6   CR 0.98   A1C 5.5        Diagnostics  No labs were ordered during this visit.   No EKG required, no history of coronary heart disease, significant arrhythmia, peripheral arterial disease or other structural heart disease.    Revised Cardiac Risk Index (RCRI)  The patient has the following serious cardiovascular risks for perioperative complications:   - No serious cardiac risks = 0 points     RCRI Interpretation: 0 points: Class I (very low risk - 0.4% complication rate)       Signed Electronically by: GABE Santiago CNP  Copy of this evaluation report is provided to requesting physician.

## 2024-03-06 ENCOUNTER — MYC MEDICAL ADVICE (OUTPATIENT)
Dept: PEDIATRICS | Facility: CLINIC | Age: 59
End: 2024-03-06
Payer: COMMERCIAL

## 2024-03-06 NOTE — TELEPHONE ENCOUNTER
Sent a [employer] message to the patient   - Informed the patient that GABE Santiago CNP will be back in clinic on Monday (3/11/2024) and she will review before the patient's upcoming surgery     Postponing to 3/11/2024 for PAL RN to ensure that GABE Santiago CNP reviews the patient's recent blood pressure readings prior to the patient's upcoming surgery.     Roberta KAUFFMAN RN   [facility]

## 2024-03-06 NOTE — TELEPHONE ENCOUNTER
Let him know Yue is back in clinic on Monday. She can review before his upcoming surgery.    Leave for PCP

## 2024-03-06 NOTE — TELEPHONE ENCOUNTER
"See patient's [employer] message   - Patient provided recent at home blood pressure readings   - Patient states that he has started to drink a lot more water   - Patient's recent at home blood pressure readings:     Date Blood Pressure Reading    2/29/2024  154/95   3/1/2024 140/90   3/2/2024 128/87   3/3/2024 140/89   3/4/2024 140/86   3/5/2024 145/88   3/6/2024  150/90      Upon chart review:   - 2/28/2024 Office Visit note with GABE Santiago CNP states, \"    Elevated blood pressure reading without diagnosis of hypertension  BP high today, rechecked and remains high, 160s/90s. Reports he has a blood pressure cuff at home and when he checks his BP at home, -140s. It sounds like he has been told he has high blood pressure in the past and for this reason he starting taking a supplement that is suppose to help with high blood pressure. His wife buys it off the internet, he doesn't know what it is called. He has no interest in starting blood pressure medications despite being counseled on the risks of long term hypertension. I have recommended he check his BP at home daily over the next week and send me readings over [employer]. Will decide at that time if he can proceed with surgery as planned.\"    GABE Santiago CNP, please review and advise.     Roberta KAUFFMAN RN   [facility]   "

## 2024-03-13 ENCOUNTER — TRANSFERRED RECORDS (OUTPATIENT)
Dept: HEALTH INFORMATION MANAGEMENT | Facility: CLINIC | Age: 59
End: 2024-03-13

## 2024-03-13 DIAGNOSIS — Z98.890 POSTOPERATIVE STATE: Primary | ICD-10-CM

## 2024-03-13 PROCEDURE — 88305 TISSUE EXAM BY PATHOLOGIST: CPT | Mod: TC,ORL | Performed by: STUDENT IN AN ORGANIZED HEALTH CARE EDUCATION/TRAINING PROGRAM

## 2024-03-13 PROCEDURE — 88304 TISSUE EXAM BY PATHOLOGIST: CPT | Mod: 26 | Performed by: PATHOLOGY

## 2024-03-13 RX ORDER — OXYCODONE HYDROCHLORIDE 5 MG/1
5 TABLET ORAL EVERY 6 HOURS PRN
Qty: 10 TABLET | Refills: 0 | Status: SHIPPED | OUTPATIENT
Start: 2024-03-13 | End: 2024-03-16

## 2024-03-13 RX ORDER — ONDANSETRON 4 MG/1
4 TABLET, ORALLY DISINTEGRATING ORAL EVERY 6 HOURS PRN
Qty: 12 TABLET | Refills: 0 | Status: SHIPPED | OUTPATIENT
Start: 2024-03-13

## 2024-03-14 ENCOUNTER — LAB REQUISITION (OUTPATIENT)
Dept: LAB | Facility: CLINIC | Age: 59
End: 2024-03-14
Payer: COMMERCIAL

## 2024-03-14 DIAGNOSIS — R22.1 LOCALIZED SWELLING, MASS AND LUMP, NECK: ICD-10-CM

## 2024-03-15 LAB
PATH REPORT.COMMENTS IMP SPEC: NORMAL
PATH REPORT.COMMENTS IMP SPEC: NORMAL
PATH REPORT.FINAL DX SPEC: NORMAL
PATH REPORT.GROSS SPEC: NORMAL
PATH REPORT.MICROSCOPIC SPEC OTHER STN: NORMAL
PATH REPORT.RELEVANT HX SPEC: NORMAL
PHOTO IMAGE: NORMAL

## 2024-03-18 ENCOUNTER — TELEPHONE (OUTPATIENT)
Dept: PLASTIC SURGERY | Facility: CLINIC | Age: 59
End: 2024-03-18

## 2024-03-18 NOTE — TELEPHONE ENCOUNTER
Telephone call  03/18/24     I called to let the patient know the results of the surgical pathology as follows:   Final Diagnosis   Soft tissue, neck, excision-  Lipoma         I left a message and encouraged him to call with any questions. He is scheduled for suture removal on 3/20.     Sofi Vanessa MD

## 2024-03-20 ENCOUNTER — OFFICE VISIT (OUTPATIENT)
Dept: PLASTIC SURGERY | Facility: CLINIC | Age: 59
End: 2024-03-20
Payer: COMMERCIAL

## 2024-03-20 DIAGNOSIS — Z98.890 POST-OPERATIVE STATE: Primary | ICD-10-CM

## 2024-03-22 NOTE — PROGRESS NOTES
Saw King one week s/p excision of submental mass (3/13/24).    He reports he is doing very well. He has not needed anything for pain after his surgery. He is appropriately swollen and erythremic. His incision is well approximated and he reports he has been diligent with cares. Sutures removed from incision line without difficulty.    Encouraged pt to schedule US of thyroid as previous CT showed thyroid nodules that may need further workup. The orders were placed in January. Pt given scheduling number again. Pt does not demonstrate eagerness to schedule imaging as he had a US of his submental mass over a year ago. Reinforced importance of imaging and follow up as this would be specific to his thyroid. Pt reports understanding of this.    We discussed continued cares not limited to continued cleansing with 1:1 hydrogen peroxide and water, frequent application of Aquaphor, sun protection and the future use of silicone scar gel and gentle massage. Pt verbalized understanding of this.    All questions answered at this time. Pt instructed to reach out if questions or concerns arise.    Follow-up appt made.    Ludy Anderson RN  3/22/2024 12:26 PM

## 2024-04-24 ENCOUNTER — HOSPITAL ENCOUNTER (OUTPATIENT)
Dept: ULTRASOUND IMAGING | Facility: CLINIC | Age: 59
Discharge: HOME | End: 2024-04-24
Attending: STUDENT IN AN ORGANIZED HEALTH CARE EDUCATION/TRAINING PROGRAM | Admitting: STUDENT IN AN ORGANIZED HEALTH CARE EDUCATION/TRAINING PROGRAM
Payer: COMMERCIAL

## 2024-04-24 DIAGNOSIS — E04.1 THYROID NODULE: ICD-10-CM

## 2024-04-24 PROCEDURE — 76536 US EXAM OF HEAD AND NECK: CPT

## 2024-04-26 ENCOUNTER — TELEPHONE (OUTPATIENT)
Dept: PLASTIC SURGERY | Facility: CLINIC | Age: 59
End: 2024-04-26

## 2024-04-26 DIAGNOSIS — E04.1 THYROID NODULE: Primary | ICD-10-CM

## 2024-04-26 NOTE — TELEPHONE ENCOUNTER
Facial Plastic and Reconstructive Surgery    I called patient to discuss the results of this thyroid ultrasound. There are a few nodules that were identified and two that we could consider FNA for further evaluation. I am going to put in the order for ultrasound guided FNA of the nodules and once those results return, will determine plan of care at that time. I would also like him to come back and see us in a follow up.     Sofi Vanessa MD

## 2024-05-14 ENCOUNTER — OFFICE VISIT (OUTPATIENT)
Dept: PLASTIC SURGERY | Facility: CLINIC | Age: 59
End: 2024-05-14
Payer: COMMERCIAL

## 2024-05-14 DIAGNOSIS — Z98.890 POSTOPERATIVE STATE: Primary | ICD-10-CM

## 2024-05-21 NOTE — PROGRESS NOTES
Saw King s/p excision of submental mass  (3/13/24).    He continues to heal appropriately. He has no concerns. We reviewed the use of silicone scar gel, gentle massage, and sun protection.  He reports no concern over the cosmetic appearance of his scar.    Follow-up scheduled.    Ludy Anderson RN  5/21/2024 4:27 PM

## 2024-06-01 ENCOUNTER — HEALTH MAINTENANCE LETTER (OUTPATIENT)
Age: 59
End: 2024-06-01

## 2024-06-06 ENCOUNTER — MYC MEDICAL ADVICE (OUTPATIENT)
Dept: OTOLARYNGOLOGY | Facility: CLINIC | Age: 59
End: 2024-06-06
Payer: COMMERCIAL

## 2024-06-26 ENCOUNTER — HOSPITAL ENCOUNTER (OUTPATIENT)
Dept: ULTRASOUND IMAGING | Facility: CLINIC | Age: 59
Discharge: HOME | End: 2024-06-26
Attending: STUDENT IN AN ORGANIZED HEALTH CARE EDUCATION/TRAINING PROGRAM | Admitting: STUDENT IN AN ORGANIZED HEALTH CARE EDUCATION/TRAINING PROGRAM
Payer: COMMERCIAL

## 2024-06-26 DIAGNOSIS — E04.1 THYROID NODULE: ICD-10-CM

## 2024-06-26 PROCEDURE — 88173 CYTOPATH EVAL FNA REPORT: CPT | Mod: TC | Performed by: STUDENT IN AN ORGANIZED HEALTH CARE EDUCATION/TRAINING PROGRAM

## 2024-06-26 PROCEDURE — 272N000431 US BIOPSY THYROID FINE NEEDLE ASPIRATION

## 2024-06-26 PROCEDURE — 88173 CYTOPATH EVAL FNA REPORT: CPT | Mod: 26 | Performed by: PATHOLOGY

## 2024-06-26 PROCEDURE — 250N000009 HC RX 250: Performed by: RADIOLOGY

## 2024-06-26 RX ADMIN — LIDOCAINE HYDROCHLORIDE 10 ML: 10 INJECTION, SOLUTION EPIDURAL; INFILTRATION; INTRACAUDAL; PERINEURAL at 14:57

## 2024-06-26 NOTE — PROGRESS NOTES
Patient tolerated right and left thyroid nodule biopsy well by Dr. Miranda.  Bandages clean, dry and intact at time of discharge.  Patient states understanding of discharge instructions and left department in satisfactory condition.

## 2024-06-29 LAB
PATH REPORT.COMMENTS IMP SPEC: NORMAL
PATH REPORT.FINAL DX SPEC: NORMAL
PATH REPORT.FINAL DX SPEC: NORMAL
PATH REPORT.GROSS SPEC: NORMAL
PATH REPORT.GROSS SPEC: NORMAL
PATH REPORT.MICROSCOPIC SPEC OTHER STN: NORMAL
PATH REPORT.MICROSCOPIC SPEC OTHER STN: NORMAL

## 2024-07-01 ENCOUNTER — TELEPHONE (OUTPATIENT)
Dept: PLASTIC SURGERY | Facility: CLINIC | Age: 59
End: 2024-07-01

## 2024-07-01 DIAGNOSIS — E04.1 THYROID NODULE: Primary | ICD-10-CM

## 2024-07-02 ENCOUNTER — TELEPHONE (OUTPATIENT)
Dept: OTOLARYNGOLOGY | Facility: CLINIC | Age: 59
End: 2024-07-02
Payer: COMMERCIAL

## 2024-07-02 NOTE — TELEPHONE ENCOUNTER
Patient Contacted for the patient to call back and schedule the following:    Appointment type: US Biopsy Thyroid FNA  Provider:   Return date: next available     Specialty phone number: [phone]  Additional appointment(s) needed:   Additional Notes:

## 2024-09-26 ENCOUNTER — HOSPITAL ENCOUNTER (OUTPATIENT)
Dept: ULTRASOUND IMAGING | Facility: CLINIC | Age: 59
Discharge: HOME | End: 2024-09-26
Attending: STUDENT IN AN ORGANIZED HEALTH CARE EDUCATION/TRAINING PROGRAM | Admitting: STUDENT IN AN ORGANIZED HEALTH CARE EDUCATION/TRAINING PROGRAM
Payer: COMMERCIAL

## 2024-09-26 DIAGNOSIS — E04.1 THYROID NODULE: ICD-10-CM

## 2024-09-26 PROCEDURE — 272N000431 US BIOPSY THYROID FINE NEEDLE ASPIRATION

## 2024-09-26 PROCEDURE — 88173 CYTOPATH EVAL FNA REPORT: CPT | Mod: 26 | Performed by: PATHOLOGY

## 2024-09-26 PROCEDURE — 10005 FNA BX W/US GDN 1ST LES: CPT

## 2024-09-26 PROCEDURE — 250N000009 HC RX 250: Performed by: RADIOLOGY

## 2024-09-26 PROCEDURE — 88173 CYTOPATH EVAL FNA REPORT: CPT | Mod: TC | Performed by: STUDENT IN AN ORGANIZED HEALTH CARE EDUCATION/TRAINING PROGRAM

## 2024-09-26 RX ADMIN — LIDOCAINE HYDROCHLORIDE 10 ML: 10 INJECTION, SOLUTION EPIDURAL; INFILTRATION; INTRACAUDAL; PERINEURAL at 10:32

## 2024-09-26 NOTE — PROGRESS NOTES
Right thyroid biopsy completed per Dr. Badillo without difficulty, patient tolerated well. Discharged to home ambulatory in stable condition.

## 2024-09-30 LAB
PATH REPORT.COMMENTS IMP SPEC: NORMAL
PATH REPORT.COMMENTS IMP SPEC: NORMAL
PATH REPORT.FINAL DX SPEC: NORMAL
PATH REPORT.GROSS SPEC: NORMAL
PATH REPORT.MICROSCOPIC SPEC OTHER STN: NORMAL
PATH REPORT.RELEVANT HX SPEC: NORMAL

## 2024-10-01 ENCOUNTER — TELEPHONE (OUTPATIENT)
Dept: PLASTIC SURGERY | Facility: CLINIC | Age: 59
End: 2024-10-01

## 2024-10-01 ENCOUNTER — TELEPHONE (OUTPATIENT)
Dept: OTOLARYNGOLOGY | Facility: CLINIC | Age: 59
End: 2024-10-01
Payer: COMMERCIAL

## 2024-10-01 NOTE — TELEPHONE ENCOUNTER
Left Voicemail (1st Attempt) for the patient to call back and schedule the following:    Appointment Type: New ENT  Provider: Dr. Ganesh Felipe and Dr. Tato Vang   Appt date: next opening. Add to waitlist  Specialty phone number: [phone]  Additional appointment(s) needed:   Additional Notes: thyroid nodules - referred by Dr. Vanessa

## 2024-10-01 NOTE — TELEPHONE ENCOUNTER
Telephone Call     I called to discuss results as follows:         Final Diagnosis   Specimen A: Thyroid, right, nodule #1 (1.7 cm), ultrasound-guided fine-needle aspiration:                 Interpretation:                  Nondiagnostic ([place] I).              See comment.                 Adequacy:                   Unsatisfactory for evaluation, Scant follicular cells present.     I am going to refer him to head and neck for further workup and management.     Sofi Vanessa MD

## 2024-11-07 NOTE — TELEPHONE ENCOUNTER
FUTURE VISIT INFORMATION      FUTURE VISIT INFORMATION:  Date: 1/6/25  Time: 3:20 PM  [place] [facility] [place]  REFERRAL INFORMATION:  Referring provider:    Referring providers clinic:    Reason for visit/diagnosis:  per pt, refd Dr Coronel, follow up thyroid nodule, [facility] confd- ok to sched yana [place]o     RECORDS REQUESTED FROM      Clinic name Comments Records Status Imaging Status   [facility] 10/1/24 telephone referral  1/5/24 [facility]- Sofi Vanessa [facility]  [facility] thyroid  1/30/24 CT neck  3/22/23 US head neck [facility] PACS   [facility] 9/26/2024 Fine Needle Aspirate Thyroid, Right: [id number]     6/26/2024 Cytology, non-gynecologic: [id number]     6/26/2024 Cytology, non-gynecologic: [id number]  [facility]    [facility] 3/21/24 Excision of submental mass w/ Sofi Vanessa MD  Scanned in

## 2024-12-31 DIAGNOSIS — Z91.030 ALLERGY TO BEE STING: ICD-10-CM

## 2024-12-31 RX ORDER — EPINEPHRINE 0.3 MG/.3ML
0.3 INJECTION SUBCUTANEOUS
Qty: 1 EACH | Status: SHIPPED | OUTPATIENT
Start: 2024-12-31

## 2025-01-06 ENCOUNTER — PRE VISIT (OUTPATIENT)
Dept: OTOLARYNGOLOGY | Facility: CLINIC | Age: 60
End: 2025-01-06

## 2025-01-06 ENCOUNTER — OFFICE VISIT (OUTPATIENT)
Dept: OTOLARYNGOLOGY | Facility: CLINIC | Age: 60
End: 2025-01-06
Payer: COMMERCIAL

## 2025-01-06 ENCOUNTER — APPOINTMENT (OUTPATIENT)
Dept: LAB | Facility: CLINIC | Age: 60
End: 2025-01-06
Payer: COMMERCIAL

## 2025-01-06 VITALS
OXYGEN SATURATION: 99 % | WEIGHT: 209 LBS | HEIGHT: 71 IN | TEMPERATURE: 97.5 F | BODY MASS INDEX: 29.26 KG/M2 | SYSTOLIC BLOOD PRESSURE: 175 MMHG | DIASTOLIC BLOOD PRESSURE: 95 MMHG | HEART RATE: 88 BPM

## 2025-01-06 DIAGNOSIS — E04.1 THYROID NODULE: Primary | ICD-10-CM

## 2025-01-06 LAB
ALBUMIN SERPL BCG-MCNC: 4.2 G/DL (ref 3.5–5.2)
CALCIUM SERPL-MCNC: 9.2 MG/DL (ref 8.8–10.4)
PHOSPHATE SERPL-MCNC: 2.8 MG/DL (ref 2.5–4.5)
PTH-INTACT SERPL-MCNC: 44 PG/ML (ref 15–65)
TSH SERPL DL<=0.005 MIU/L-ACNC: 1.68 UIU/ML (ref 0.3–4.2)
VIT D+METAB SERPL-MCNC: 20 NG/ML (ref 20–50)

## 2025-01-06 PROCEDURE — 99000 SPECIMEN HANDLING OFFICE-LAB: CPT | Performed by: PATHOLOGY

## 2025-01-06 PROCEDURE — 84100 ASSAY OF PHOSPHORUS: CPT | Performed by: PATHOLOGY

## 2025-01-06 PROCEDURE — 83970 ASSAY OF PARATHORMONE: CPT | Performed by: PATHOLOGY

## 2025-01-06 PROCEDURE — 82040 ASSAY OF SERUM ALBUMIN: CPT | Performed by: PATHOLOGY

## 2025-01-06 PROCEDURE — 84443 ASSAY THYROID STIM HORMONE: CPT | Performed by: PATHOLOGY

## 2025-01-06 PROCEDURE — 99215 OFFICE O/P EST HI 40 MIN: CPT | Performed by: STUDENT IN AN ORGANIZED HEALTH CARE EDUCATION/TRAINING PROGRAM

## 2025-01-06 PROCEDURE — 36415 COLL VENOUS BLD VENIPUNCTURE: CPT | Performed by: PATHOLOGY

## 2025-01-06 PROCEDURE — 82310 ASSAY OF CALCIUM: CPT | Performed by: PATHOLOGY

## 2025-01-06 PROCEDURE — 82306 VITAMIN D 25 HYDROXY: CPT | Performed by: STUDENT IN AN ORGANIZED HEALTH CARE EDUCATION/TRAINING PROGRAM

## 2025-01-06 ASSESSMENT — PAIN SCALES - GENERAL: PAINLEVEL_OUTOF10: MILD PAIN (3)

## 2025-01-06 NOTE — PATIENT INSTRUCTIONS
You were seen in the ENT Clinic today by Dr. Felipe. If you have any questions or concerns after your appointment, please contact us (see below)    The following has been recommended for you based upon your appointment today:  Labs today (we will call you with results)   Repeat thyroid US in 6 months if labs are normal     Please return to clinic to be determined after labs     How to Contact Us:  Send a HSTYLE message to your provider. Our team will respond to you via H[employer]YLE. Occasionally, we will need to call you to get further information.  For urgent matters (Monday-Friday), call the ENT Clinic: [phone] and speak with a call center team member - they will route your call appropriately.   If you'd like to speak directly with a nurse, please find our contact information below. We do our best to check voicemail frequently throughout the day, and will work to call you back within 1-2 days. For urgent matters, please use the general clinic phone numbers listed above.    Lyric VILLALOBOS RN, BSN  RN Care Coordinator, ENT Clinic  [facility]  Direct: [phone]

## 2025-01-06 NOTE — LETTER
1/6/2025       RE: Francisco Castle  [address]  [facility] [address]     Dear Colleague,    Thank you for referring your patient, Francisco Castle, to the [facility] [place] at [facility]. Please see a copy of my visit note below.    Head and neck surgery  January 6, 2025    I the pleasure meeting Mr. Castle today in clinic.  He is a pleasant 59-year-old male referred for evaluation of thyroid nodules.  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  A CT scan of the neck showed incidental thyroid nodules    Initial ultrasound was performed that commented on multiple thyroid nodules.  There is a right inferior TR4 nodule, left mid TR 4 nodule, left inferior TR 4 nodule, and a left inferior TR 4 nodule.    Fine-needle aspirates performed on the right nodule and one of the left nodules.  The left nodule is benign.  However despite multiple attempts of biopsy of the right nodule the biopsies have been nondiagnostic    He presents today for additional evaluation    He has no family history of thyroid cancer or radiation exposure to the neck    Past medical history:  None    Past surgical history:  None    Medications:  None    Allergies:  Beatties    Social history:  Former smoker quit in 1988  Occasional alcohol use    Family history:  None    Physical examination:  Alert in no acute distress  No palpable cervical adenopathy or thyroid masses  No lesions seen in the oral cavity or oropharynx    Assessment and plan:  59-year-old male with incidental thyroid nodules.  The dominant nodule in the left side is benign while the nodule on the right side has been nondiagnostic on 2 separate fine-needle aspirations.    I reviewed the imaging with him today.  I question whether this is extrathyroidal and represents a parathyroid adenoma.  We will check labs today.    Nevertheless it does not appear to be a concerning nodule.  We  discussed options of continued observation versus repeat FNA versus diagnostic right hemithyroidectomy.  He prefers to observe and I feel comfortable with this.  I recommend a repeat ultrasound in approximately 6 months.  If stable I think he can be followed by endocrinology.    We will follow-up on his labs for today.    Ganesh Felipe MD    45 minutes spent on the date of the encounter in chart review, patient visit, review of tests, documentation and/or discussion with other providers about the issues documented above     Again, thank you for allowing me to participate in the care of your patient.      Sincerely,    Ganesh Felipe MD

## 2025-01-06 NOTE — NURSING NOTE
"Chief Complaint   Patient presents with    RECHECK     Follow up thyroid     Consult     Follow up thyroid from Doctor Chaim Greene     Blood pressure (!) 193/92, pulse 94, temperature 97.5  F (36.4  C), height 1.803 m (5' 11\"), weight 94.8 kg (209 lb), SpO2 99%.    George Limon LPN    "

## 2025-01-07 ENCOUNTER — MYC MEDICAL ADVICE (OUTPATIENT)
Dept: OTOLARYNGOLOGY | Facility: CLINIC | Age: 60
End: 2025-01-07
Payer: COMMERCIAL

## 2025-01-07 DIAGNOSIS — E04.1 THYROID NODULE: Primary | ICD-10-CM

## 2025-01-07 NOTE — PROGRESS NOTES
Head and neck surgery  January 6, 2025    I the pleasure meeting Mr. Castle today in clinic.  He is a pleasant 59-year-old male referred for evaluation of thyroid nodules.  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  A CT scan of the neck showed incidental thyroid nodules    Initial ultrasound was performed that commented on multiple thyroid nodules.  There is a right inferior TR4 nodule, left mid TR 4 nodule, left inferior TR 4 nodule, and a left inferior TR 4 nodule.    Fine-needle aspirates performed on the right nodule and one of the left nodules.  The left nodule is benign.  However despite multiple attempts of biopsy of the right nodule the biopsies have been nondiagnostic    He presents today for additional evaluation    He has no family history of thyroid cancer or radiation exposure to the neck    Past medical history:  None    Past surgical history:  None    Medications:  None    Allergies:  Beatties    Social history:  Former smoker quit in 1988  Occasional alcohol use    Family history:  None    Physical examination:  Alert in no acute distress  No palpable cervical adenopathy or thyroid masses  No lesions seen in the oral cavity or oropharynx    Assessment and plan:  59-year-old male with incidental thyroid nodules.  The dominant nodule in the left side is benign while the nodule on the right side has been nondiagnostic on 2 separate fine-needle aspirations.    I reviewed the imaging with him today.  I question whether this is extrathyroidal and represents a parathyroid adenoma.  We will check labs today.    Nevertheless it does not appear to be a concerning nodule.  We discussed options of continued observation versus repeat FNA versus diagnostic right hemithyroidectomy.  He prefers to observe and I feel comfortable with this.  I recommend a repeat ultrasound in approximately 6 months.  If stable I think he can be followed by endocrinology.    We will follow-up on his labs for  today.    Ganesh Felipe MD    45 minutes spent on the date of the encounter in chart review, patient visit, review of tests, documentation and/or discussion with other providers about the issues documented above

## 2025-06-14 ENCOUNTER — HEALTH MAINTENANCE LETTER (OUTPATIENT)
Age: 60
End: 2025-06-14